# Patient Record
Sex: FEMALE | Race: WHITE | URBAN - METROPOLITAN AREA
[De-identification: names, ages, dates, MRNs, and addresses within clinical notes are randomized per-mention and may not be internally consistent; named-entity substitution may affect disease eponyms.]

---

## 2017-01-31 ENCOUNTER — RADIANT APPOINTMENT (OUTPATIENT)
Dept: ULTRASOUND IMAGING | Facility: CLINIC | Age: 22
End: 2017-01-31
Attending: NURSE PRACTITIONER
Payer: COMMERCIAL

## 2017-01-31 DIAGNOSIS — Z34.02 ENCOUNTER FOR SUPERVISION OF NORMAL FIRST PREGNANCY IN SECOND TRIMESTER: ICD-10-CM

## 2017-01-31 PROCEDURE — 76805 OB US >/= 14 WKS SNGL FETUS: CPT

## 2017-02-01 ENCOUNTER — PRENATAL OFFICE VISIT (OUTPATIENT)
Dept: OBGYN | Facility: CLINIC | Age: 22
End: 2017-02-01
Payer: COMMERCIAL

## 2017-02-01 VITALS
WEIGHT: 153 LBS | HEIGHT: 63 IN | TEMPERATURE: 97.7 F | SYSTOLIC BLOOD PRESSURE: 136 MMHG | DIASTOLIC BLOOD PRESSURE: 77 MMHG | BODY MASS INDEX: 27.11 KG/M2 | HEART RATE: 79 BPM

## 2017-02-01 DIAGNOSIS — Z34.02 ENCOUNTER FOR SUPERVISION OF NORMAL FIRST PREGNANCY IN SECOND TRIMESTER: Primary | ICD-10-CM

## 2017-02-01 PROCEDURE — 99000 SPECIMEN HANDLING OFFICE-LAB: CPT | Performed by: NURSE PRACTITIONER

## 2017-02-01 PROCEDURE — 81511 FTL CGEN ABNOR FOUR ANAL: CPT | Mod: 90 | Performed by: NURSE PRACTITIONER

## 2017-02-01 PROCEDURE — 99207 ZZC PRENATAL VISIT: CPT | Performed by: NURSE PRACTITIONER

## 2017-02-01 PROCEDURE — 36415 COLL VENOUS BLD VENIPUNCTURE: CPT | Performed by: NURSE PRACTITIONER

## 2017-02-01 ASSESSMENT — PAIN SCALES - GENERAL: PAINLEVEL: NO PAIN (0)

## 2017-02-01 NOTE — NURSING NOTE
"Chief Complaint   Patient presents with     Prenatal Care     19w4d       Initial /77 mmHg  Pulse 79  Temp(Src) 97.7  F (36.5  C) (Oral)  Ht 5' 3.25\" (1.607 m)  Wt 153 lb (69.4 kg)  BMI 26.87 kg/m2  LMP 09/12/2016 Estimated body mass index is 26.87 kg/(m^2) as calculated from the following:    Height as of this encounter: 5' 3.25\" (1.607 m).    Weight as of this encounter: 153 lb (69.4 kg)..  BP completed using cuff size: bree Paris CMA      "

## 2017-02-01 NOTE — PROGRESS NOTES
Patient presents for routine prenatal visit. Prenatal flowsheet reviewed and updated as needed.  Denies vaginal bleeding, loss of fluid, contractions or cramping.  Patient without complaint. Had ultrasound and we discussed results.  I discussed with the patient the option of maternal serum screening for chromasomal abnormalities (such as Trisomy 18 and 21) and neural tube defects.  We discussed the screening nature of this test and the potential for false negative and false positive results and the possible need for additional testing including Level 2 ultrasound and amniocentesis. She is given the opportunity to ask questions and have them answered. She accepts testing.  Patient has gained weight since last visit, but still down overall. Will monitor.  Advice as per Anticipatory Guidance/Checklist updated.  PE: See OB vitals    Questions asked and answered. Next OB visit in 4 week(s) with Dr. Mejía.    Jessie FINLEY CNP

## 2017-02-01 NOTE — MR AVS SNAPSHOT
"              After Visit Summary   2/1/2017    Fernando Holloway    MRN: [id number]           Patient Information     Date Of Birth          1995        Visit Information        Provider Department      2/1/2017 4:10 PM Jessie Obregon APRN CNP [facility]        Today's Diagnoses     Encounter for supervision of normal first pregnancy in second trimester    -  1        Follow-ups after your visit        Who to contact     If you have questions or need follow up information about today's clinic visit or your schedule please contact [facility] directly at [phone].  Normal or non-critical lab and imaging results will be communicated to you by [email or web address], letter or phone within 4 business days after the clinic has received the results. If you do not hear from us within 7 days, please contact the clinic through [email or web address] or phone. If you have a critical or abnormal lab result, we will notify you by phone as soon as possible.  Submit refill requests through Active Scaler or call your pharmacy and they will forward the refill request to us. Please allow 3 business days for your refill to be completed.          Additional Information About Your Visit        MyChart Information     Active Scaler gives you secure access to your electronic health record. If you see a primary care provider, you can also send messages to your care team and make appointments. If you have questions, please call your primary care clinic.  If you do not have a primary care provider, please call [phone] and they will assist you.        Care EveryWhere ID     This is your Care EveryWhere ID. This could be used by other organizations to access your [place] medical records  [id number]        Your Vitals Were     Pulse Temperature Height BMI (Body Mass Index) Last Period       79 97.7  F (36.5  C) (Oral) 5' 3.25\" (1.607 m) 26.87 kg/m2 09/12/2016        Blood Pressure from Last 3 Encounters:   02/01/17 136/77   12/30/16 " 135/72   11/30/16 124/86    Weight from Last 3 Encounters:   02/01/17 153 lb (69.4 kg)   12/30/16 148 lb 3.2 oz (67.223 kg)   11/30/16 151 lb 9.6 oz (68.765 kg)              We Performed the Following     Maternal quad screen        Primary Care Provider Office Phone # Fax #    Kristen M Kehr, PA-C [phone]       Located within [facility] [address] [place] [address]        Thank you!     Thank you for choosing [facility]  for your care. Our goal is always to provide you with excellent care. Hearing back from our patients is one way we can continue to improve our services. Please take a few minutes to complete the written survey that you may receive in the mail after your visit with us. Thank you!             Your Updated Medication List - Protect others around you: Learn how to safely use, store and throw away your medicines at www.disposemymeds.org.          This list is accurate as of: 2/1/17  4:20 PM.  Always use your most recent med list.                   Brand Name Dispense Instructions for use    prenatal multivitamin  plus iron 27-0.8 MG Tabs per tablet     100 tablet    Take 1 tablet by mouth daily

## 2017-02-03 LAB
# FETUSES US: NORMAL
AFP ADJ MOM AMN: 1.08
AFP SERPL-MCNC: 57 NG/ML
AGE - REPORTED: 22.5
DATING METHOD: NORMAL
DIABETIC AT CONCEPTION: NO
FAMILY MEMBER DISEASES HX: NO
FAMILY MEMBER DISEASES HX: NO
GA METHOD: NORMAL
GA: 19.57 WK
HCG MOM SERPL: 1.36
HCG SERPL-ACNC: NORMAL M[IU]/ML
HX OF HEREDITARY DISORDERS: NO
IDDM PATIENT QL: NO
INHIBIN A MOM SERPL: 0.63
INHIBIN A SERPL-MCNC: 106
INTEGRATED SCN PATIENT-IMP: NORMAL
LMP START DATE: NORMAL
PATHOLOGY STUDY: NORMAL
PREV HX CHROMOSOME ABNORMALITY: NORMAL
SPECIMEN DRAWN SERPL: NORMAL
TWINS: NORMAL
U ESTRIOL MOM SERPL: 0.92
U ESTRIOL SERPL-MCNC: 1.83 NG/ML

## 2017-02-14 NOTE — PROGRESS NOTES
SUBJECTIVE:                                                    Fernando Holloway is a 21 year old female who presents to clinic today for the following health issues:    RESPIRATORY SYMPTOMS      Duration: 1.5 weeks     Description  nasal congestion,body aches at times and cough    Severity: moderate    Accompanying signs and symptoms: None    History (predisposing factors):  Been around lots of people that have been ill     Precipitating or alleviating factors: pt is pregnant 22wks.     Therapies tried and outcome:  none   No fevers, abdominal pain or spotting.     Problem list and histories reviewed & adjusted, as indicated.  Additional history: as documented    Patient Active Problem List   Diagnosis     Headache     Conjunctivitis, acute     Exercise-induced asthma     Overactive bladder     Need for Tdap vaccination     Supervision of normal first pregnancy     Rh negative state in antepartum period     Past Surgical History   Procedure Laterality Date     No history of surgery         Social History   Substance Use Topics     Smoking status: Never Smoker     Smokeless tobacco: Never Used      Comment: Lives in smoke free household     Alcohol use No     Family History   Problem Relation Age of Onset     Depression Mother      DIABETES Mother      Glaucoma No family hx of      Macular Degeneration No family hx of      CANCER No family hx of      Hypertension No family hx of      CEREBROVASCULAR DISEASE No family hx of      Thyroid Disease No family hx of          Current Outpatient Prescriptions   Medication Sig Dispense Refill     Prenatal Vit-Fe Fumarate-FA (PRENATAL MULTIVITAMIN  PLUS IRON) 27-0.8 MG TABS Take 1 tablet by mouth daily 100 tablet 3     Allergies   Allergen Reactions     Nkda [No Known Drug Allergies]      Recent Labs   Lab Test  10/11/16   1021   TSH  3.42      Problem list, Medication list, Allergies, and Medical/Social/Surgical histories reviewed in EPIC and updated as  appropriate.    ROS:  Constitutional, HEENT, cardiovascular, pulmonary, gi and gu systems are negative, except as otherwise noted.    OBJECTIVE:                                                    /68  Pulse 88  Temp 97.5  F (36.4  C) (Oral)  Wt 153 lb (69.4 kg)  LMP 09/12/2016  SpO2 98%  BMI 26.89 kg/m2  Body mass index is 26.89 kg/(m^2).  GENERAL: healthy, alert and no distress  EYES: Eyes grossly normal to inspection, PERRL and conjunctivae and sclerae normal  HENT: ear canals and TM's normal, nose and mouth without ulcers or lesions  NECK: no adenopathy, no asymmetry, masses, or scars and thyroid normal to palpation  RESP: lungs clear to auscultation - no rales, rhonchi or wheezes  CV: regular rate and rhythm, normal S1 S2, no S3 or S4, no murmur, click or rub, no peripheral edema and peripheral pulses strong    Diagnostic Test Results:  none      ASSESSMENT/PLAN:                                                        ICD-10-CM    1. Upper respiratory tract infection, unspecified type J06.9    Warning signs discussed.  side effects discussed  Symptomatic treatment: such as fluids,  OTC acetaminophen and /or non-steroidal anti-inflammatory medication.  Follow up  1-2 wks as needed   Sheet was given for approved over the counter meds to take with pregnancy .    Duane Anderson PA-C  [facility]

## 2017-02-15 ENCOUNTER — OFFICE VISIT (OUTPATIENT)
Dept: FAMILY MEDICINE | Facility: CLINIC | Age: 22
End: 2017-02-15
Payer: COMMERCIAL

## 2017-02-15 VITALS
OXYGEN SATURATION: 98 % | SYSTOLIC BLOOD PRESSURE: 132 MMHG | HEART RATE: 88 BPM | WEIGHT: 153 LBS | DIASTOLIC BLOOD PRESSURE: 68 MMHG | TEMPERATURE: 97.5 F | BODY MASS INDEX: 26.89 KG/M2

## 2017-02-15 DIAGNOSIS — J06.9 UPPER RESPIRATORY TRACT INFECTION, UNSPECIFIED TYPE: Primary | ICD-10-CM

## 2017-02-15 PROCEDURE — 99213 OFFICE O/P EST LOW 20 MIN: CPT | Performed by: PHYSICIAN ASSISTANT

## 2017-02-15 NOTE — NURSING NOTE
"Chief Complaint   Patient presents with     Cough       Initial /68  Pulse 88  Temp 97.5  F (36.4  C) (Oral)  Wt 153 lb (69.4 kg)  LMP 09/12/2016  SpO2 98%  BMI 26.89 kg/m2 Estimated body mass index is 26.89 kg/(m^2) as calculated from the following:    Height as of 2/1/17: 5' 3.25\" (1.607 m).    Weight as of this encounter: 153 lb (69.4 kg).  Medication Reconciliation: complete  Guera Grover CMA    "

## 2017-02-15 NOTE — LETTER
[facility]  [address] [facility] [address]  Phone: [phone]    February 15, 2017        Fernando Holloway  [address] [place] [address]          To whom it may concern:    RE: Fernando Holloway    Patient was seen and treated today at our clinic and missed work.    Please contact me for questions or concerns.      Sincerely,        Duane Anderson PA-C

## 2017-03-02 ENCOUNTER — DOCUMENTATION ONLY (OUTPATIENT)
Dept: LAB | Facility: CLINIC | Age: 22
End: 2017-03-02

## 2017-03-02 DIAGNOSIS — Z67.91 RH NEGATIVE STATE IN ANTEPARTUM PERIOD, SECOND TRIMESTER: ICD-10-CM

## 2017-03-02 DIAGNOSIS — Z34.02 ENCOUNTER FOR SUPERVISION OF NORMAL FIRST PREGNANCY IN SECOND TRIMESTER: Primary | ICD-10-CM

## 2017-03-02 DIAGNOSIS — O26.892 RH NEGATIVE STATE IN ANTEPARTUM PERIOD, SECOND TRIMESTER: ICD-10-CM

## 2017-03-02 NOTE — PROGRESS NOTES
Please review and order laboratory future orders for patient  upcoming lab appointment on 03/06/17.    Thank you,   Sussy Carty MLT

## 2017-03-06 ENCOUNTER — PRENATAL OFFICE VISIT (OUTPATIENT)
Dept: OBGYN | Facility: CLINIC | Age: 22
End: 2017-03-06
Payer: COMMERCIAL

## 2017-03-06 VITALS
HEIGHT: 63 IN | TEMPERATURE: 97.4 F | SYSTOLIC BLOOD PRESSURE: 135 MMHG | WEIGHT: 159.2 LBS | DIASTOLIC BLOOD PRESSURE: 76 MMHG | HEART RATE: 84 BPM | BODY MASS INDEX: 28.21 KG/M2

## 2017-03-06 DIAGNOSIS — Z67.91 RH NEGATIVE STATE IN ANTEPARTUM PERIOD, SECOND TRIMESTER: ICD-10-CM

## 2017-03-06 DIAGNOSIS — Z34.02 ENCOUNTER FOR SUPERVISION OF NORMAL FIRST PREGNANCY IN SECOND TRIMESTER: ICD-10-CM

## 2017-03-06 DIAGNOSIS — R73.09 ABNORMAL GLUCOSE TOLERANCE TEST: Primary | ICD-10-CM

## 2017-03-06 DIAGNOSIS — O26.892 RH NEGATIVE STATE IN ANTEPARTUM PERIOD, SECOND TRIMESTER: ICD-10-CM

## 2017-03-06 DIAGNOSIS — Z34.02 ENCOUNTER FOR SUPERVISION OF NORMAL FIRST PREGNANCY IN SECOND TRIMESTER: Primary | ICD-10-CM

## 2017-03-06 LAB
ABO + RH BLD: NORMAL
ABO + RH BLD: NORMAL
BLD GP AB SCN SERPL QL: NORMAL
BLOOD BANK CMNT PATIENT-IMP: NORMAL
GLUCOSE 1H P 50 G GLC PO SERPL-MCNC: 187 MG/DL (ref 60–129)
HGB BLD-MCNC: 12.5 G/DL (ref 11.7–15.7)
SPECIMEN EXP DATE BLD: NORMAL

## 2017-03-06 PROCEDURE — 86900 BLOOD TYPING SEROLOGIC ABO: CPT | Performed by: NURSE PRACTITIONER

## 2017-03-06 PROCEDURE — 86901 BLOOD TYPING SEROLOGIC RH(D): CPT | Performed by: NURSE PRACTITIONER

## 2017-03-06 PROCEDURE — 86850 RBC ANTIBODY SCREEN: CPT | Performed by: NURSE PRACTITIONER

## 2017-03-06 PROCEDURE — 82950 GLUCOSE TEST: CPT | Performed by: NURSE PRACTITIONER

## 2017-03-06 PROCEDURE — 36415 COLL VENOUS BLD VENIPUNCTURE: CPT | Performed by: NURSE PRACTITIONER

## 2017-03-06 PROCEDURE — 85018 HEMOGLOBIN: CPT | Performed by: NURSE PRACTITIONER

## 2017-03-06 PROCEDURE — 99207 ZZC PRENATAL VISIT: CPT | Performed by: NURSE PRACTITIONER

## 2017-03-06 ASSESSMENT — PAIN SCALES - GENERAL: PAINLEVEL: NO PAIN (0)

## 2017-03-06 NOTE — PROGRESS NOTES
Patient presents for routine prenatal visit. Prenatal flowsheet reviewed and updated as needed.  Denies vaginal bleeding, loss of fluid, contractions or cramping.  Patient without complaint. 1 hour GTT, HGB and Antibody screening today.    Plan Rhogam and Tdap on return to clinic.    Advice as per Anticipatory Guidance/Checklist updated.  PE: See OB vitals    Questions asked and answered. Next OB visit in 4 week(s) - must be with MD. Jessie FINLEY CNP

## 2017-03-06 NOTE — NURSING NOTE
"Chief Complaint   Patient presents with     Prenatal Care     24w2d       Initial /76  Pulse 84  Temp 97.4  F (36.3  C) (Oral)  Ht 5' 3.25\" (1.607 m)  Wt 159 lb 3.2 oz (72.2 kg)  LMP 09/12/2016  BMI 27.98 kg/m2 Estimated body mass index is 27.98 kg/(m^2) as calculated from the following:    Height as of this encounter: 5' 3.25\" (1.607 m).    Weight as of this encounter: 159 lb 3.2 oz (72.2 kg)..  BP completed using cuff size: bree Paris CMA    "

## 2017-03-06 NOTE — MR AVS SNAPSHOT
"              After Visit Summary   3/6/2017    Fernando Holloway    MRN: [id number]           Patient Information     Date Of Birth          1995        Visit Information        Provider Department      3/6/2017 2:30 PM Jessie Obregon APRN CNP [facility]        Today's Diagnoses     Encounter for supervision of normal first pregnancy in second trimester    -  1       Follow-ups after your visit        Who to contact     If you have questions or need follow up information about today's clinic visit or your schedule please contact [facility] directly at [phone].  Normal or non-critical lab and imaging results will be communicated to you by Baremetricshart, letter or phone within 4 business days after the clinic has received the results. If you do not hear from us within 7 days, please contact the clinic through Baremetricshart or phone. If you have a critical or abnormal lab result, we will notify you by phone as soon as possible.  Submit refill requests through Cookisto or call your pharmacy and they will forward the refill request to us. Please allow 3 business days for your refill to be completed.          Additional Information About Your Visit        MyChart Information     Cookisto gives you secure access to your electronic health record. If you see a primary care provider, you can also send messages to your care team and make appointments. If you have questions, please call your primary care clinic.  If you do not have a primary care provider, please call [phone] and they will assist you.        Care EveryWhere ID     This is your Care EveryWhere ID. This could be used by other organizations to access your [place] medical records  [id number]        Your Vitals Were     Pulse Temperature Height Last Period BMI (Body Mass Index)       84 97.4  F (36.3  C) (Oral) 5' 3.25\" (1.607 m) 09/12/2016 27.98 kg/m2        Blood Pressure from Last 3 Encounters:   03/06/17 135/76   02/15/17 " 132/68   02/01/17 136/77    Weight from Last 3 Encounters:   03/06/17 159 lb 3.2 oz (72.2 kg)   02/15/17 153 lb (69.4 kg)   02/01/17 153 lb (69.4 kg)              Today, you had the following     No orders found for display       Primary Care Provider Office Phone # Fax #    Kristen M Kehr, PA-C [phone]       [facility] [address] [place] [address]        Thank you!     Thank you for choosing [facility]  for your care. Our goal is always to provide you with excellent care. Hearing back from our patients is one way we can continue to improve our services. Please take a few minutes to complete the written survey that you may receive in the mail after your visit with us. Thank you!             Your Updated Medication List - Protect others around you: Learn how to safely use, store and throw away your medicines at www.disposemymeds.org.          This list is accurate as of: 3/6/17  2:40 PM.  Always use your most recent med list.                   Brand Name Dispense Instructions for use    prenatal multivitamin  plus iron 27-0.8 MG Tabs per tablet     100 tablet    Take 1 tablet by mouth daily

## 2017-03-15 DIAGNOSIS — R73.09 ABNORMAL GLUCOSE TOLERANCE TEST: ICD-10-CM

## 2017-03-15 LAB
GLUCOSE 1H P 100 G GLC PO SERPL-MCNC: 207 MG/DL (ref 60–179)
GLUCOSE 2H P 100 G GLC PO SERPL-MCNC: 141 MG/DL (ref 60–154)
GLUCOSE 3H P 100 G GLC PO SERPL-MCNC: 70 MG/DL (ref 60–139)
GLUCOSE P FAST SERPL-MCNC: 90 MG/DL (ref 60–94)

## 2017-03-15 PROCEDURE — 82952 GTT-ADDED SAMPLES: CPT | Performed by: NURSE PRACTITIONER

## 2017-03-15 PROCEDURE — 82951 GLUCOSE TOLERANCE TEST (GTT): CPT | Performed by: NURSE PRACTITIONER

## 2017-03-15 PROCEDURE — 36415 COLL VENOUS BLD VENIPUNCTURE: CPT | Performed by: NURSE PRACTITIONER

## 2017-04-03 ENCOUNTER — TELEPHONE (OUTPATIENT)
Dept: FAMILY MEDICINE | Facility: CLINIC | Age: 22
End: 2017-04-03

## 2017-04-03 NOTE — TELEPHONE ENCOUNTER
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  Perhaps, Dr MURRAY Savage.  Please inform patient.  Queenie Olivares RN

## 2017-04-03 NOTE — TELEPHONE ENCOUNTER
King is 28 weeks pregnant and wants to deliver at [facility].  Jessie referred her to OB/Family Practice.  Will Dr Navarro take her on?

## 2017-04-04 NOTE — TELEPHONE ENCOUNTER
Notified patient of message below and scheduled appointment with Dr. Savage./Harriett Betancourt,

## 2017-04-10 ENCOUNTER — PRENATAL OFFICE VISIT (OUTPATIENT)
Dept: FAMILY MEDICINE | Facility: CLINIC | Age: 22
End: 2017-04-10
Payer: COMMERCIAL

## 2017-04-10 VITALS
DIASTOLIC BLOOD PRESSURE: 78 MMHG | HEART RATE: 98 BPM | BODY MASS INDEX: 29.24 KG/M2 | WEIGHT: 166.4 LBS | TEMPERATURE: 98 F | SYSTOLIC BLOOD PRESSURE: 132 MMHG

## 2017-04-10 DIAGNOSIS — Z34.03 ENCOUNTER FOR SUPERVISION OF NORMAL FIRST PREGNANCY IN THIRD TRIMESTER: Primary | ICD-10-CM

## 2017-04-10 PROCEDURE — 96372 THER/PROPH/DIAG INJ SC/IM: CPT | Performed by: FAMILY MEDICINE

## 2017-04-10 PROCEDURE — 99207 ZZC PRENATAL VISIT: CPT | Performed by: FAMILY MEDICINE

## 2017-04-10 NOTE — MR AVS SNAPSHOT
After Visit Summary   4/10/2017    Fernando Holloway    MRN: [id number]           Patient Information     Date Of Birth          1995        Visit Information        Provider Department      4/10/2017 3:30 PM Jeff Savage MD [facility]        Today's Diagnoses     Encounter for supervision of normal first pregnancy in third trimester    -  1       Follow-ups after your visit        Your next 10 appointments already scheduled     May 01, 2017  4:15 PM CDT   ESTABLISHED PRENATAL with Jeff Savage MD   [facility] [place] ([facility] [place]    [address]  [place] [id number] [phone]            May 15, 2017  4:15 PM CDT   ESTABLISHED PRENATAL with Jeff Savage MD   [facility] [place] ([facility] [place])    [address]  [place] [address] [phone]            May 30, 2017  4:15 PM CDT   ESTABLISHED PRENATAL with Jeff Savage MD   [facility] [place] ([facility])    [address]  [place] [address] [phone]            Jun 05, 2017  4:15 PM CDT   ESTABLISHED PRENATAL with Jeff Savage MD   [facility] [place] ([facility] [place])    [address]  [place] [address] [phone]            Jun 19, 2017  4:15 PM CDT   ESTABLISHED PRENATAL with Jeff Savage MD   [facility] [place] ([facility] [place])    [address]  [place] [address] [phone]            Jun 26, 2017  4:15 PM CDT   ESTABLISHED PRENATAL with Jeff Savage MD   [facility]    [address]  [place] [address] [phone]              Who to contact     If you have questions or need follow up information about today's clinic visit or your schedule please contact [facility] directly at [phone].  Normal or non-critical lab and imaging results will be  communicated to you by TalkMarketshart, letter or phone within 4 business days after the clinic has received the results. If you do not hear from us within 7 days, please contact the clinic through Zulahoo or phone. If you have a critical or abnormal lab result, we will notify you by phone as soon as possible.  Submit refill requests through Zulahoo or call your pharmacy and they will forward the refill request to us. Please allow 3 business days for your refill to be completed.          Additional Information About Your Visit        Zulahoo Information     Zulahoo gives you secure access to your electronic health record. If you see a primary care provider, you can also send messages to your care team and make appointments. If you have questions, please call your primary care clinic.  If you do not have a primary care provider, please call [phone] and they will assist you.        Care EveryWhere ID     This is your Care EveryWhere ID. This could be used by other organizations to access your [place] medical records  [id number]        Your Vitals Were     Pulse Temperature Last Period BMI (Body Mass Index)          98 98  F (36.7  C) (Oral) 09/12/2016 29.24 kg/m2         Blood Pressure from Last 3 Encounters:   04/10/17 132/78   03/06/17 135/76   02/15/17 132/68    Weight from Last 3 Encounters:   04/10/17 166 lb 6.4 oz (75.5 kg)   03/06/17 159 lb 3.2 oz (72.2 kg)   02/15/17 153 lb (69.4 kg)              We Performed the Following     RH IMMUNE GLOBULIN        Primary Care Provider Office Phone # Fax #    Kristen M Kehr, PA-C [phone]       [facility] [address] [facility] [address]        Thank you!     Thank you for choosing [facility]  for your care. Our goal is always to provide you with excellent care. Hearing back from our patients is one way we can continue to improve our services. Please take a few minutes to complete the written survey that you may receive in  the mail after your visit with us. Thank you!             Your Updated Medication List - Protect others around you: Learn how to safely use, store and throw away your medicines at www.disposemymeds.org.          This list is accurate as of: 4/10/17  6:23 PM.  Always use your most recent med list.                   Brand Name Dispense Instructions for use    prenatal multivitamin  plus iron 27-0.8 MG Tabs per tablet     100 tablet    Take 1 tablet by mouth daily

## 2017-04-10 NOTE — NURSING NOTE
"Chief Complaint   Patient presents with     Prenatal Care     nneding to do rhogam       Initial /78  Pulse 98  Temp 98  F (36.7  C) (Oral)  Wt 166 lb 6.4 oz (75.5 kg)  LMP 2016  BMI 29.24 kg/m2 Estimated body mass index is 29.24 kg/(m^2) as calculated from the following:    Height as of 3/6/17: 5' 3.25\" (1.607 m).    Weight as of this encounter: 166 lb 6.4 oz (75.5 kg).  Medication Reconciliation: complete  Gil Wilkinson CMA      The following medication was given:     MEDICATION: RhoGam 300 ug  ROUTE: IM  SITE: LUQ - Gluteus  DOSE: 300 IU  LOT #: [id number]   :  [place]  EXPIRATION DATE:  17  NDC#: 3682-1253-90  Gil Wilkinson CMA       "

## 2017-04-10 NOTE — LETTER
[facility]  [address] [facility] [address]  [phone]          April 10, 2017    Fernando Holloway  [address] [place] [address] [address] [place] [address]    To whom it may concern,     Fernando Holloway was seen in clinic on Monday 4/10/17. If you have any questions please call the clnic.      Sincerely,        Jeff Savage MD

## 2017-05-01 ENCOUNTER — PRENATAL OFFICE VISIT (OUTPATIENT)
Dept: FAMILY MEDICINE | Facility: CLINIC | Age: 22
End: 2017-05-01
Payer: MEDICAID

## 2017-05-01 VITALS
WEIGHT: 168.4 LBS | HEART RATE: 84 BPM | SYSTOLIC BLOOD PRESSURE: 126 MMHG | TEMPERATURE: 98.7 F | DIASTOLIC BLOOD PRESSURE: 72 MMHG | BODY MASS INDEX: 29.6 KG/M2

## 2017-05-01 DIAGNOSIS — M53.3 SACRO ILIAL PAIN: ICD-10-CM

## 2017-05-01 DIAGNOSIS — Z34.03 ENCOUNTER FOR SUPERVISION OF NORMAL FIRST PREGNANCY IN THIRD TRIMESTER: Primary | ICD-10-CM

## 2017-05-01 PROCEDURE — 99207 ZZC PRENATAL VISIT: CPT | Performed by: FAMILY MEDICINE

## 2017-05-01 NOTE — NURSING NOTE
"Chief Complaint   Patient presents with     Prenatal Care       Initial /72  Pulse 84  Temp 98.7  F (37.1  C) (Oral)  Wt 168 lb 6.4 oz (76.4 kg)  LMP 09/12/2016  BMI 29.6 kg/m2 Estimated body mass index is 29.6 kg/(m^2) as calculated from the following:    Height as of 3/6/17: 5' 3.25\" (1.607 m).    Weight as of this encounter: 168 lb 6.4 oz (76.4 kg).  Medication Reconciliation: complete  Gil Wilkinson CMA    "

## 2017-05-01 NOTE — MR AVS SNAPSHOT
After Visit Summary   5/1/2017    Fernando Holloway    MRN: [id number]           Patient Information     Date Of Birth          1995        Visit Information        Provider Department      5/1/2017 4:15 PM Jeff Savage MD [facility]        Today's Diagnoses     Encounter for supervision of normal first pregnancy in third trimester    -  1    Sacro ilial pain           Follow-ups after your visit        Additional Services     [facility] PT, HAND, AND CHIROPRACTIC REFERRAL       **This order will print in the [facility] Scheduling Office**    Physical Therapy, Hand Therapy and Chiropractic Care are available through:    *[facility]  *[facility]  *[facility]    Call one number to schedule at any of the above locations: [phone].    Your provider has referred you to: As Indicated:      Indication/Reason for Referral: si joint  Onset of Illness: one month  Therapy Orders: Evaluate and Treat  Special Programs: None  Special Request: None    Ami Mueller      Additional Comments for the Therapist or Chiropractor: please have chiropractor who sees pregnant woman    Please be aware that coverage of these services is subject to the terms and limitations of your health insurance plan.  Call member services at your health plan with any benefit or coverage questions.      Please bring the following to your appointment:    *Your personal calendar for scheduling future appointments  *Comfortable clothing                  Your next 10 appointments already scheduled     May 15, 2017  4:15 PM CDT   ESTABLISHED PRENATAL with Jeff Savage MD   [facility]    [address] [facility] [address] [phone]            May 30, 2017  4:15 PM CDT   ESTABLISHED PRENATAL with Jeff Savage MD   [facility]    [address] [facility]  [address] [phone]            Jun 05, 2017  4:15 PM CDT   ESTABLISHED PRENATAL with Jeff Savage MD   [facility]    [address]   [place] [address] [phone]            Jun 19, 2017  4:15 PM CDT   ESTABLISHED PRENATAL with Jeff Savage MD   [facility]    [address] [facility] [address] [phone]            Jun 26, 2017  4:15 PM CDT   ESTABLISHED PRENATAL with Jeff Savage MD   [facility]    [address] [facility] [address] [phone]              Who to contact     If you have questions or need follow up information about today's clinic visit or your schedule please contact [facility] directly at [phone].  Normal or non-critical lab and imaging results will be communicated to you by [employer], letter or phone within 4 business days after the clinic has received the results. If you do not hear from us within 7 days, please contact the clinic through TabSyst or phone. If you have a critical or abnormal lab result, we will notify you by phone as soon as possible.  Submit refill requests through Smartaxi or call your pharmacy and they will forward the refill request to us. Please allow 3 business days for your refill to be completed.          Additional Information About Your Visit        [employer] Information     Smart[employer]i gives you secure access to your electronic health record. If you see a primary care provider, you can also send messages to your care team and make appointments. If you have questions, please call your primary care clinic.  If you do not have a primary care provider, please call [phone] and they will assist you.        Care EveryWhere ID     This is your Care EveryWhere ID. This could be used by other organizations to access your [place] medical records  [id number]        Your  Vitals Were     Pulse Temperature Last Period BMI (Body Mass Index)          84 98.7  F (37.1  C) (Oral) 09/12/2016 29.6 kg/m2         Blood Pressure from Last 3 Encounters:   05/01/17 126/72   04/10/17 132/78   03/06/17 135/76    Weight from Last 3 Encounters:   05/01/17 168 lb 6.4 oz (76.4 kg)   04/10/17 166 lb 6.4 oz (75.5 kg)   03/06/17 159 lb 3.2 oz (72.2 kg)              We Performed the Following     [facility] PT, HAND, AND CHIROPRACTIC REFERRAL        Primary Care Provider Office Phone # Fax #    Kristen M Kehr, PA-C [phone]       [facility] [address] [facility] [address]        Thank you!     Thank you for choosing [facility]  for your care. Our goal is always to provide you with excellent care. Hearing back from our patients is one way we can continue to improve our services. Please take a few minutes to complete the written survey that you may receive in the mail after your visit with us. Thank you!             Your Updated Medication List - Protect others around you: Learn how to safely use, store and throw away your medicines at www.disposemymeds.org.          This list is accurate as of: 5/1/17  4:36 PM.  Always use your most recent med list.                   Brand Name Dispense Instructions for use    prenatal multivitamin  plus iron 27-0.8 MG Tabs per tablet     100 tablet    Take 1 tablet by mouth daily

## 2017-05-02 ASSESSMENT — ASTHMA QUESTIONNAIRES: ACT_TOTALSCORE: 25

## 2017-05-15 ENCOUNTER — PRENATAL OFFICE VISIT (OUTPATIENT)
Dept: FAMILY MEDICINE | Facility: CLINIC | Age: 22
End: 2017-05-15
Payer: MEDICAID

## 2017-05-15 VITALS
HEART RATE: 79 BPM | BODY MASS INDEX: 30.58 KG/M2 | SYSTOLIC BLOOD PRESSURE: 133 MMHG | DIASTOLIC BLOOD PRESSURE: 80 MMHG | WEIGHT: 174 LBS | TEMPERATURE: 98 F

## 2017-05-15 DIAGNOSIS — Z34.03 ENCOUNTER FOR SUPERVISION OF NORMAL FIRST PREGNANCY IN THIRD TRIMESTER: ICD-10-CM

## 2017-05-15 DIAGNOSIS — Z23 NEED FOR VACCINATION: Primary | ICD-10-CM

## 2017-05-15 PROCEDURE — 90471 IMMUNIZATION ADMIN: CPT | Performed by: FAMILY MEDICINE

## 2017-05-15 PROCEDURE — 99207 ZZC PRENATAL VISIT: CPT | Performed by: FAMILY MEDICINE

## 2017-05-15 PROCEDURE — 90715 TDAP VACCINE 7 YRS/> IM: CPT | Performed by: FAMILY MEDICINE

## 2017-05-15 NOTE — MR AVS SNAPSHOT
After Visit Summary   5/15/2017    Fernando Holloway    MRN: [id number]           Patient Information     Date Of Birth          1995        Visit Information        Provider Department      5/15/2017 4:15 PM Jeff Savage MD [facility]        Today's Diagnoses     Need for vaccination    -  1    Encounter for supervision of normal first pregnancy in third trimester           Follow-ups after your visit        Your next 10 appointments already scheduled     May 30, 2017  4:15 PM CDT   ESTABLISHED PRENATAL with Jeff Savage MD   [facility]    [address]   [place] [address]   [phone]            Jun 05, 2017  4:15 PM CDT   ESTABLISHED PRENATAL with Jeff Savage MD   [facility]    [address]   [place] [address]   [phone]            Jun 19, 2017  4:15 PM CDT   ESTABLISHED PRENATAL with Jeff Savage MD   [facility]    [address]   [place] [address] [phone]            Jun 26, 2017  4:15 PM CDT   ESTABLISHED PRENATAL with Jeff Savage MD   [facility])    [address] [facility]  [place] [address] [phone]              Who to contact     If you have questions or need follow up information about today's clinic visit or your schedule please contact [facility] directly at [phone].  Normal or non-critical lab and imaging results will be communicated to you by MyChart, letter or phone within 4 business days after the clinic has received the results. If you do not hear from us within 7 days, please contact the clinic through [employer] or phone. If you have a critical or abnormal lab result, we will notify you by phone as soon as possible.  Submit refill requests through Metwit or call your pharmacy and they will  forward the refill request to us. Please allow 3 business days for your refill to be completed.          Additional Information About Your Visit        [employer] Information     [employer] gives you secure access to your electronic health record. If you see a primary care provider, you can also send messages to your care team and make appointments. If you have questions, please call your primary care clinic.  If you do not have a primary care provider, please call [phone] and they will assist you.        Care EveryWhere ID     This is your Care EveryWhere ID. This could be used by other organizations to access your [place] medical records  [id number]        Your Vitals Were     Pulse Temperature Last Period BMI (Body Mass Index)          79 98  F (36.7  C) (Oral) 09/12/2016 30.58 kg/m2         Blood Pressure from Last 3 Encounters:   05/15/17 133/80   05/01/17 126/72   04/10/17 132/78    Weight from Last 3 Encounters:   05/15/17 174 lb (78.9 kg)   05/01/17 168 lb 6.4 oz (76.4 kg)   04/10/17 166 lb 6.4 oz (75.5 kg)              We Performed the Following     ADMIN 1st VACCINE     TDAP VACCINE (ADACEL)        Primary Care Provider Office Phone # Fax #    Kristen M Kehr, PA-C [phone]       [facility] [address] [facility] [address]        Thank you!     Thank you for choosing [facility]  for your care. Our goal is always to provide you with excellent care. Hearing back from our patients is one way we can continue to improve our services. Please take a few minutes to complete the written survey that you may receive in the mail after your visit with us. Thank you!             Your Updated Medication List - Protect others around you: Learn how to safely use, store and throw away your medicines at www.disposemymeds.org.          This list is accurate as of: 5/15/17  4:35 PM.  Always use your most recent med list.                   Brand Name Dispense Instructions for  use    prenatal multivitamin  plus iron 27-0.8 MG Tabs per tablet     100 tablet    Take 1 tablet by mouth daily

## 2017-05-15 NOTE — NURSING NOTE
"Chief Complaint   Patient presents with     Prenatal Care     swelling in hands and feet x 1 week       Initial /80  Pulse 79  Temp 98  F (36.7  C) (Oral)  Wt 174 lb (78.9 kg)  LMP 09/12/2016  BMI 30.58 kg/m2 Estimated body mass index is 30.58 kg/(m^2) as calculated from the following:    Height as of 3/6/17: 5' 3.25\" (1.607 m).    Weight as of this encounter: 174 lb (78.9 kg).  Medication Reconciliation: complete  Gil Wilkinson CMA    "

## 2017-05-15 NOTE — NURSING NOTE
Prior to injection verified patient identity using patient's name and date of birth.    Patient instructed to wait in clinic for 20 minutes following injection and to notify staff of any adverse reactions immediately.     Screening Questionnaire for Adult Immunization     Are you sick today?   No   Do you have allergies to medications, food or any vaccine?   No   Have you ever had a serious reaction after receiving a vaccination?   No   Do you have a long-term health problem with heart disease, lung disease,  asthma, kidney disease, diabetes, anemia, metabolic or blood disease?   No   Do you have cancer, leukemia, AIDS, or any immune system problem?   No   Do you take cortisone, prednisone, other steroids, or anticancer drugs, or  have you had any x-ray (radiation) treatments?   No   Have you had a seizure, brain, or other nervous system problem?   No   During the past year, have you received a transfusion of blood or blood       products, or been given a medicine called immune (gamma) globulin?   No   For women: Are you pregnant or is there a chance you could become         pregnant during the next month?   Yes   Have you received any vaccinations in the past 4 weeks?   No    Immunization questionnaire was positive for at least one answer.  Notified Dr. Savage.      MNVFC doesn't apply on this patient  Gil Wilkinson, [facility]

## 2017-05-30 ENCOUNTER — PRENATAL OFFICE VISIT (OUTPATIENT)
Dept: FAMILY MEDICINE | Facility: CLINIC | Age: 22
End: 2017-05-30
Payer: COMMERCIAL

## 2017-05-30 VITALS
TEMPERATURE: 97.6 F | SYSTOLIC BLOOD PRESSURE: 137 MMHG | HEART RATE: 78 BPM | DIASTOLIC BLOOD PRESSURE: 85 MMHG | BODY MASS INDEX: 31.88 KG/M2 | WEIGHT: 181.4 LBS

## 2017-05-30 DIAGNOSIS — Z34.03 ENCOUNTER FOR SUPERVISION OF NORMAL FIRST PREGNANCY IN THIRD TRIMESTER: Primary | ICD-10-CM

## 2017-05-30 PROCEDURE — 87653 STREP B DNA AMP PROBE: CPT | Performed by: FAMILY MEDICINE

## 2017-05-30 PROCEDURE — 99207 ZZC PRENATAL VISIT: CPT | Performed by: FAMILY MEDICINE

## 2017-05-30 NOTE — PROGRESS NOTES
Discussed labor and delivery.  Told to call them before going. Discussed operative delivery,epidural, episiotomy, hep b, tdap and group b strep.  Group b step preformed today.  Questions of all above were answered to patient satisfaction.

## 2017-05-30 NOTE — NURSING NOTE
"Chief Complaint   Patient presents with     Prenatal Care     edema in feet getting worse, some swelling in hands        Initial /85  Pulse 78  Temp 97.6  F (36.4  C) (Oral)  Wt 181 lb 6.4 oz (82.3 kg)  LMP 09/12/2016  BMI 31.88 kg/m2 Estimated body mass index is 31.88 kg/(m^2) as calculated from the following:    Height as of 3/6/17: 5' 3.25\" (1.607 m).    Weight as of this encounter: 181 lb 6.4 oz (82.3 kg).  Medication Reconciliation: complete  Gil Wilkinson CMA    "

## 2017-05-30 NOTE — MR AVS SNAPSHOT
After Visit Summary   5/30/2017    Fernando Holloway    MRN: [id number]           Patient Information     Date Of Birth          1995        Visit Information        Provider Department      5/30/2017 4:15 PM Jeff Savage MD [facility]        Today's Diagnoses     Encounter for supervision of normal first pregnancy in third trimester    -  1       Follow-ups after your visit        Your next 10 appointments already scheduled     Jun 05, 2017  4:15 PM CDT   ESTABLISHED PRENATAL with Jeff Savage MD   [facility]    [address] [facility] [address] [phone]            Jun 19, 2017  4:15 PM CDT   ESTABLISHED PRENATAL with Jeff Savage MD   [facility]    [address] [facility] [address] [phone]            Jun 26, 2017  4:15 PM CDT   ESTABLISHED PRENATAL with Jeff Savage MD   [facility]    [address] [facility] [address] [phone]              Who to contact     If you have questions or need follow up information about today's clinic visit or your schedule please contact [facility] directly at [phone].  Normal or non-critical lab and imaging results will be communicated to you by MyChart, letter or phone within 4 business days after the clinic has received the results. If you do not hear from us within 7 days, please contact the clinic through pickrsethart or phone. If you have a critical or abnormal lab result, we will notify you by phone as soon as possible.  Submit refill requests through Quri or call your pharmacy and they will forward the refill request to us. Please allow 3 business days for your refill to be completed.          Additional Information About Your Visit        Quri Information     [employer] gives you secure access to your electronic health  record. If you see a primary care provider, you can also send messages to your care team and make appointments. If you have questions, please call your primary care clinic.  If you do not have a primary care provider, please call [phone] and they will assist you.        Care EveryWhere ID     This is your Care EveryWhere ID. This could be used by other organizations to access your [place] medical records  [id number]        Your Vitals Were     Pulse Temperature Last Period BMI (Body Mass Index)          78 97.6  F (36.4  C) (Oral) 09/12/2016 31.88 kg/m2         Blood Pressure from Last 3 Encounters:   05/30/17 137/85   05/15/17 133/80   05/01/17 126/72    Weight from Last 3 Encounters:   05/30/17 181 lb 6.4 oz (82.3 kg)   05/15/17 174 lb (78.9 kg)   05/01/17 168 lb 6.4 oz (76.4 kg)              We Performed the Following     Group B strep PCR        Primary Care Provider Office Phone # Fax #    Kristen M Kehr, PA-C [phone]       [facility] [address] [facility] [address]        Thank you!     Thank you for choosing [facility]  for your care. Our goal is always to provide you with excellent care. Hearing back from our patients is one way we can continue to improve our services. Please take a few minutes to complete the written survey that you may receive in the mail after your visit with us. Thank you!             Your Updated Medication List - Protect others around you: Learn how to safely use, store and throw away your medicines at www.disposemymeds.org.          This list is accurate as of: 5/30/17  4:55 PM.  Always use your most recent med list.                   Brand Name Dispense Instructions for use    prenatal multivitamin  plus iron 27-0.8 MG Tabs per tablet     100 tablet    Take 1 tablet by mouth daily

## 2017-05-31 LAB
GP B STREP DNA SPEC QL NAA+PROBE: NORMAL
SPECIMEN SOURCE: NORMAL

## 2017-06-05 ENCOUNTER — PRENATAL OFFICE VISIT (OUTPATIENT)
Dept: FAMILY MEDICINE | Facility: CLINIC | Age: 22
End: 2017-06-05
Payer: COMMERCIAL

## 2017-06-05 VITALS
BODY MASS INDEX: 31.92 KG/M2 | SYSTOLIC BLOOD PRESSURE: 139 MMHG | HEART RATE: 71 BPM | TEMPERATURE: 97.9 F | DIASTOLIC BLOOD PRESSURE: 82 MMHG | WEIGHT: 181.6 LBS

## 2017-06-05 DIAGNOSIS — Z34.03 ENCOUNTER FOR SUPERVISION OF NORMAL FIRST PREGNANCY IN THIRD TRIMESTER: Primary | ICD-10-CM

## 2017-06-05 PROCEDURE — 99207 ZZC PRENATAL VISIT: CPT | Performed by: FAMILY MEDICINE

## 2017-06-05 PROCEDURE — 59426 ANTEPARTUM CARE ONLY: CPT | Performed by: FAMILY MEDICINE

## 2017-06-05 NOTE — NURSING NOTE
"Chief Complaint   Patient presents with     Prenatal Care     swelling still noted        Initial /90  Pulse 71  Temp 97.9  F (36.6  C) (Oral)  Wt 181 lb 9.6 oz (82.4 kg)  LMP 09/12/2016  BMI 31.92 kg/m2 Estimated body mass index is 31.92 kg/(m^2) as calculated from the following:    Height as of 3/6/17: 5' 3.25\" (1.607 m).    Weight as of this encounter: 181 lb 9.6 oz (82.4 kg).  Medication Reconciliation: complete  Gil Wilkinson CMA    "

## 2017-06-05 NOTE — MR AVS SNAPSHOT
After Visit Summary   6/5/2017    Fernando Holloway    MRN: [id number]           Patient Information     Date Of Birth          1995        Visit Information        Provider Department      6/5/2017 4:15 PM Jeff Savage MD [facility]        Today's Diagnoses     Encounter for supervision of normal first pregnancy in third trimester    -  1       Follow-ups after your visit        Your next 10 appointments already scheduled     Jun 19, 2017  4:15 PM CDT   ESTABLISHED PRENATAL with Jeff Savage MD   [facility]    [address] [facility] [address] [phone]            Jun 26, 2017  4:15 PM CDT   ESTABLISHED PRENATAL with Jeff Savage MD   [facility]    [address] [facility] [address] [phone]              Who to contact     If you have questions or need follow up information about today's clinic visit or your schedule please contact [facility] directly at [phone].  Normal or non-critical lab and imaging results will be communicated to you by HomeJabhart, letter or phone within 4 business days after the clinic has received the results. If you do not hear from us within 7 days, please contact the clinic through HomeJabhart or phone. If you have a critical or abnormal lab result, we will notify you by phone as soon as possible.  Submit refill requests through [employer] or call your pharmacy and they will forward the refill request to us. Please allow 3 business days for your refill to be completed.          Additional Information About Your Visit        MyChart Information     [employer] gives you secure access to your electronic health record. If you see a primary care provider, you can also send messages to your care team and make appointments. If you have questions, please call your primary care clinic.  If you do not have a primary care  provider, please call [phone] and they will assist you.        Care EveryWhere ID     This is your Care EveryWhere ID. This could be used by other organizations to access your [place] medical records  [id number]        Your Vitals Were     Pulse Temperature Last Period BMI (Body Mass Index)          71 97.9  F (36.6  C) (Oral) 09/12/2016 31.92 kg/m2         Blood Pressure from Last 3 Encounters:   06/05/17 139/82   05/30/17 137/85   05/15/17 133/80    Weight from Last 3 Encounters:   06/05/17 181 lb 9.6 oz (82.4 kg)   05/30/17 181 lb 6.4 oz (82.3 kg)   05/15/17 174 lb (78.9 kg)              Today, you had the following     No orders found for display       Primary Care Provider Office Phone # Fax #    Kristen M Kehr, PA-C [phone]       [facility] [address] [facility] [address]        Thank you!     Thank you for choosing [facility]  for your care. Our goal is always to provide you with excellent care. Hearing back from our patients is one way we can continue to improve our services. Please take a few minutes to complete the written survey that you may receive in the mail after your visit with us. Thank you!             Your Updated Medication List - Protect others around you: Learn how to safely use, store and throw away your medicines at www.disposemymeds.org.          This list is accurate as of: 6/5/17  4:37 PM.  Always use your most recent med list.                   Brand Name Dispense Instructions for use    prenatal multivitamin  plus iron 27-0.8 MG Tabs per tablet     100 tablet    Take 1 tablet by mouth daily

## 2017-06-11 ENCOUNTER — NURSE TRIAGE (OUTPATIENT)
Dept: NURSING | Facility: CLINIC | Age: 22
End: 2017-06-11

## 2017-06-11 NOTE — TELEPHONE ENCOUNTER
"  Reason for Disposition    Leakage of fluid from vagina    Additional Information    Negative: [1] SEVERE abdominal pain (e.g., excruciating) AND [2] constant AND [3] present > 1 hour    Negative: Severe bleeding (e.g., continuous red blood from vagina, or large blood clots)    Negative: Umbilical cord hanging out of the vagina (shiny, white, curled appearance, \"like telephone cord\")    Negative: Uncontrollable urge to push (i.e., feels like baby is coming out now)    Negative: Can see baby    Negative: Sounds like a life-threatening emergency to the triager    Negative: < 20 weeks pregnant    Negative: Vaginal bleeding    Protocols used: PREGNANCY - RUPTURE OF MEMBRANES-ADULT-  Patient is pregnant and VIKRAM is 06/24/17. Patient states her water broke. Triager called out to answering service, left message for Dr. Jeff Savage to call patient at [phone].  "

## 2017-08-03 ENCOUNTER — PRENATAL OFFICE VISIT (OUTPATIENT)
Dept: OBGYN | Facility: CLINIC | Age: 22
End: 2017-08-03
Payer: COMMERCIAL

## 2017-08-03 VITALS
TEMPERATURE: 97.2 F | HEIGHT: 63 IN | BODY MASS INDEX: 26.12 KG/M2 | SYSTOLIC BLOOD PRESSURE: 132 MMHG | WEIGHT: 147.4 LBS | HEART RATE: 61 BPM | DIASTOLIC BLOOD PRESSURE: 79 MMHG

## 2017-08-03 DIAGNOSIS — Z30.09 BIRTH CONTROL COUNSELING: ICD-10-CM

## 2017-08-03 PROBLEM — Z34.03 ENCOUNTER FOR SUPERVISION OF NORMAL FIRST PREGNANCY IN THIRD TRIMESTER: Status: RESOLVED | Noted: 2017-05-01 | Resolved: 2017-08-03

## 2017-08-03 RX ORDER — ACETAMINOPHEN AND CODEINE PHOSPHATE 120; 12 MG/5ML; MG/5ML
1 SOLUTION ORAL DAILY
Qty: 84 TABLET | Refills: 3 | Status: SHIPPED | OUTPATIENT
Start: 2017-08-03

## 2017-08-03 ASSESSMENT — PAIN SCALES - GENERAL: PAINLEVEL: NO PAIN (0)

## 2017-08-03 NOTE — MR AVS SNAPSHOT
"              After Visit Summary   8/3/2017    Fernando Holloway    MRN: [id number]           Patient Information     Date Of Birth          1995        Visit Information        Provider Department      8/3/2017 12:10 PM Jessie Obregon APRN CNP [facility]        Today's Diagnoses     Routine postpartum follow-up    -  1    Birth control counseling           Follow-ups after your visit        Who to contact     If you have questions or need follow up information about today's clinic visit or your schedule please contact [facility] directly at [phone].  Normal or non-critical lab and imaging results will be communicated to you by [facility], letter or phone within 4 business days after the clinic has received the results. If you do not hear from us within 7 days, please contact the clinic through [facility] or phone. If you have a critical or abnormal lab result, we will notify you by phone as soon as possible.  Submit refill requests through "[facility] or call your pharmacy and they will forward the refill request to us. Please allow 3 business days for your refill to be completed.          Additional Information About Your Visit        MyChart Information     "[facility] gives you secure access to your electronic health record. If you see a primary care provider, you can also send messages to your care team and make appointments. If you have questions, please call your primary care clinic.  If you do not have a primary care provider, please call [phone] and they will assist you.        Care EveryWhere ID     This is your Care EveryWhere ID. This could be used by other organizations to access your [place] medical records  [id number]        Your Vitals Were     Pulse Temperature Height Last Period Breastfeeding? BMI (Body Mass Index)    61 97.2  F (36.2  C) (Oral) 5' 3.25\" (1.607 m) 09/12/2016 Yes 25.9 kg/m2       Blood Pressure from Last 3 Encounters:   08/03/17 132/79   06/05/17 " 139/82   05/30/17 137/85    Weight from Last 3 Encounters:   08/03/17 147 lb 6.4 oz (66.9 kg)   06/05/17 181 lb 9.6 oz (82.4 kg)   05/30/17 181 lb 6.4 oz (82.3 kg)              Today, you had the following     No orders found for display         Today's Medication Changes          These changes are accurate as of: 8/3/17 12:13 PM.  If you have any questions, ask your nurse or doctor.               Start taking these medicines.        Dose/Directions    norethindrone 0.35 MG per tablet   Commonly known as:  MICRONOR   Used for:  Birth control counseling   Started by:  Jessie Obregon APRN CNP        Dose:  1 tablet   Take 1 tablet (0.35 mg) by mouth daily   Quantity:  84 tablet   Refills:  3            Where to get your medicines      These medications were sent to [id number] - [address] [place] AT [address] [place], [facility] [place] [address]     Phone:  [phone]     norethindrone 0.35 MG per tablet                Primary Care Provider Office Phone # Fax #    Kristen M Kehr, PA-C [phone]       [facility] [address] [facility] [address]        Equal Access to Services     CARMINA WARD : Hadii bridgette ku hadasho Soomaali, waaxda luqadaha, qaybta kaalmada adeegyada, sohail casey. So [facility] [phone].    ATENCIÓN: Si habla español, tiene a leger disposición servicios gratuitos de asistencia lingüística. Alexandr al [phone].    We comply with applicable federal civil rights laws and [place] laws. We do not discriminate on the basis of race, color, national origin, age, disability sex, sexual orientation or gender identity.            Thank you!     Thank you for choosing [facility]  for your care. Our goal is always to provide you with excellent care. Hearing back from our patients is one way we can continue to improve our services. Please take a few minutes to  complete the written survey that you may receive in the mail after your visit with us. Thank you!             Your Updated Medication List - Protect others around you: Learn how to safely use, store and throw away your medicines at www.disposemymeds.org.          This list is accurate as of: 8/3/17 12:13 PM.  Always use your most recent med list.                   Brand Name Dispense Instructions for use Diagnosis    norethindrone 0.35 MG per tablet    MICRONOR    84 tablet    Take 1 tablet (0.35 mg) by mouth daily    Birth control counseling       prenatal multivitamin  plus iron 27-0.8 MG Tabs per tablet     100 tablet    Take 1 tablet by mouth daily    Less than 8 weeks gestation of pregnancy

## 2017-08-03 NOTE — NURSING NOTE
"Chief Complaint   Patient presents with     Post Partum Exam       Initial /79  Pulse 61  Temp 97.2  F (36.2  C) (Oral)  Ht 5' 3.25\" (1.607 m)  Wt 147 lb 6.4 oz (66.9 kg)  LMP 09/12/2016  Breastfeeding? Yes  BMI 25.9 kg/m2 Estimated body mass index is 25.9 kg/(m^2) as calculated from the following:    Height as of this encounter: 5' 3.25\" (1.607 m).    Weight as of this encounter: 147 lb 6.4 oz (66.9 kg)..  BP completed using cuff size: bree [place] CMA    "

## 2017-08-04 ASSESSMENT — PATIENT HEALTH QUESTIONNAIRE - PHQ9: SUM OF ALL RESPONSES TO PHQ QUESTIONS 1-9: 0

## 2020-02-23 ENCOUNTER — HEALTH MAINTENANCE LETTER (OUTPATIENT)
Age: 25
End: 2020-02-23

## 2020-12-13 ENCOUNTER — HEALTH MAINTENANCE LETTER (OUTPATIENT)
Age: 25
End: 2020-12-13

## 2021-04-17 ENCOUNTER — HEALTH MAINTENANCE LETTER (OUTPATIENT)
Age: 26
End: 2021-04-17

## 2021-09-26 ENCOUNTER — HEALTH MAINTENANCE LETTER (OUTPATIENT)
Age: 26
End: 2021-09-26

## 2022-05-08 ENCOUNTER — HEALTH MAINTENANCE LETTER (OUTPATIENT)
Age: 27
End: 2022-05-08

## 2023-01-08 ENCOUNTER — HEALTH MAINTENANCE LETTER (OUTPATIENT)
Age: 28
End: 2023-01-08

## 2023-06-02 ENCOUNTER — HEALTH MAINTENANCE LETTER (OUTPATIENT)
Age: 28
End: 2023-06-02